# Patient Record
Sex: MALE | Race: WHITE | NOT HISPANIC OR LATINO | Employment: FULL TIME | URBAN - METROPOLITAN AREA
[De-identification: names, ages, dates, MRNs, and addresses within clinical notes are randomized per-mention and may not be internally consistent; named-entity substitution may affect disease eponyms.]

---

## 2019-02-14 ENCOUNTER — TELEPHONE (OUTPATIENT)
Dept: GASTROENTEROLOGY | Facility: CLINIC | Age: 49
End: 2019-02-14

## 2019-02-14 NOTE — TELEPHONE ENCOUNTER
Spoke w pt to offer asap appt on 2/22. 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

## 2019-02-19 ENCOUNTER — TELEPHONE (OUTPATIENT)
Dept: GASTROENTEROLOGY | Facility: CLINIC | Age: 49
End: 2019-02-19

## 2019-02-19 NOTE — TELEPHONE ENCOUNTER
Called pt and scheduled NP appointment for 3/12/19 at 9:30 am. Discussed with pt that Dr. Alfredo is a consultant who will send them back to their general GI provider once their symptoms improved and plan of care is established. Pt was told the logistics of the appointment (arrival time, length of visit, total time spent at facility, and Nathalia Warren, NP role). Pt was also told that Dr. Alfredo will review their previous workup but may order additional test and perform her own procedures and that this may require an overnight stay at a local hotel to complete the workup.

## 2019-03-12 ENCOUNTER — LAB VISIT (OUTPATIENT)
Dept: LAB | Facility: HOSPITAL | Age: 49
End: 2019-03-12
Payer: COMMERCIAL

## 2019-03-12 ENCOUNTER — OFFICE VISIT (OUTPATIENT)
Dept: GASTROENTEROLOGY | Facility: CLINIC | Age: 49
End: 2019-03-12
Payer: COMMERCIAL

## 2019-03-12 ENCOUNTER — TELEPHONE (OUTPATIENT)
Dept: GASTROENTEROLOGY | Facility: CLINIC | Age: 49
End: 2019-03-12

## 2019-03-12 VITALS
WEIGHT: 276.88 LBS | HEART RATE: 72 BPM | HEIGHT: 74 IN | SYSTOLIC BLOOD PRESSURE: 118 MMHG | DIASTOLIC BLOOD PRESSURE: 83 MMHG | BODY MASS INDEX: 35.53 KG/M2

## 2019-03-12 DIAGNOSIS — K21.9 GASTROESOPHAGEAL REFLUX DISEASE WITHOUT ESOPHAGITIS: Primary | ICD-10-CM

## 2019-03-12 DIAGNOSIS — R07.9 CHEST PAIN, UNSPECIFIED TYPE: ICD-10-CM

## 2019-03-12 DIAGNOSIS — R13.19 ESOPHAGEAL DYSPHAGIA: ICD-10-CM

## 2019-03-12 DIAGNOSIS — K21.9 GASTROESOPHAGEAL REFLUX DISEASE WITHOUT ESOPHAGITIS: ICD-10-CM

## 2019-03-12 LAB
ALBUMIN SERPL BCP-MCNC: 4.2 G/DL
ALP SERPL-CCNC: 71 U/L
ALT SERPL W/O P-5'-P-CCNC: 135 U/L
ANION GAP SERPL CALC-SCNC: 7 MMOL/L
AST SERPL-CCNC: 63 U/L
BASOPHILS # BLD AUTO: 0.02 K/UL
BASOPHILS NFR BLD: 0.4 %
BILIRUB SERPL-MCNC: 0.8 MG/DL
BUN SERPL-MCNC: 16 MG/DL
CALCIUM SERPL-MCNC: 10 MG/DL
CHLORIDE SERPL-SCNC: 106 MMOL/L
CO2 SERPL-SCNC: 26 MMOL/L
CREAT SERPL-MCNC: 1.1 MG/DL
DIFFERENTIAL METHOD: NORMAL
EOSINOPHIL # BLD AUTO: 0.1 K/UL
EOSINOPHIL NFR BLD: 2.2 %
ERYTHROCYTE [DISTWIDTH] IN BLOOD BY AUTOMATED COUNT: 12.1 %
EST. GFR  (AFRICAN AMERICAN): >60 ML/MIN/1.73 M^2
EST. GFR  (NON AFRICAN AMERICAN): >60 ML/MIN/1.73 M^2
GLUCOSE SERPL-MCNC: 113 MG/DL
HCT VFR BLD AUTO: 43.8 %
HGB BLD-MCNC: 15 G/DL
IMM GRANULOCYTES # BLD AUTO: 0.01 K/UL
IMM GRANULOCYTES NFR BLD AUTO: 0.2 %
LYMPHOCYTES # BLD AUTO: 2 K/UL
LYMPHOCYTES NFR BLD: 36.2 %
MCH RBC QN AUTO: 30.4 PG
MCHC RBC AUTO-ENTMCNC: 34.2 G/DL
MCV RBC AUTO: 89 FL
MONOCYTES # BLD AUTO: 0.6 K/UL
MONOCYTES NFR BLD: 11 %
NEUTROPHILS # BLD AUTO: 2.7 K/UL
NEUTROPHILS NFR BLD: 50 %
NRBC BLD-RTO: 0 /100 WBC
PLATELET # BLD AUTO: 230 K/UL
PMV BLD AUTO: 9.5 FL
POTASSIUM SERPL-SCNC: 4.6 MMOL/L
PROT SERPL-MCNC: 7.4 G/DL
RBC # BLD AUTO: 4.93 M/UL
SODIUM SERPL-SCNC: 139 MMOL/L
TSH SERPL DL<=0.005 MIU/L-ACNC: 1.98 UIU/ML
WBC # BLD AUTO: 5.47 K/UL

## 2019-03-12 PROCEDURE — 84443 ASSAY THYROID STIM HORMONE: CPT

## 2019-03-12 PROCEDURE — 36415 COLL VENOUS BLD VENIPUNCTURE: CPT

## 2019-03-12 PROCEDURE — 85025 COMPLETE CBC W/AUTO DIFF WBC: CPT

## 2019-03-12 PROCEDURE — 80053 COMPREHEN METABOLIC PANEL: CPT

## 2019-03-12 PROCEDURE — 99999 PR PBB SHADOW E&M-EST. PATIENT-LVL V: CPT | Mod: PBBFAC,,, | Performed by: NURSE PRACTITIONER

## 2019-03-12 PROCEDURE — 99999 PR PBB SHADOW E&M-EST. PATIENT-LVL V: ICD-10-PCS | Mod: PBBFAC,,, | Performed by: NURSE PRACTITIONER

## 2019-03-12 PROCEDURE — 99245 PR OFFICE CONSULTATION,LEVEL V: ICD-10-PCS | Mod: S$GLB,,, | Performed by: NURSE PRACTITIONER

## 2019-03-12 PROCEDURE — 99245 OFF/OP CONSLTJ NEW/EST HI 55: CPT | Mod: S$GLB,,, | Performed by: NURSE PRACTITIONER

## 2019-03-12 RX ORDER — AMLODIPINE BESYLATE 5 MG/1
5 TABLET ORAL DAILY
COMMUNITY

## 2019-03-12 RX ORDER — OLMESARTAN MEDOXOMIL, AMLODIPINE AND HYDROCHLOROTHIAZIDE TABLET 40/5/25 MG 40; 5; 25 MG/1; MG/1; MG/1
TABLET ORAL
COMMUNITY

## 2019-03-12 RX ORDER — MONTELUKAST SODIUM 10 MG/1
10 TABLET ORAL NIGHTLY
COMMUNITY

## 2019-03-12 RX ORDER — PANTOPRAZOLE SODIUM 40 MG/1
40 TABLET, DELAYED RELEASE ORAL DAILY
COMMUNITY
End: 2019-03-12 | Stop reason: DRUGHIGH

## 2019-03-12 RX ORDER — PANTOPRAZOLE SODIUM 40 MG/1
40 TABLET, DELAYED RELEASE ORAL 2 TIMES DAILY
Qty: 180 TABLET | Refills: 3 | Status: SHIPPED | OUTPATIENT
Start: 2019-03-12 | End: 2019-07-16 | Stop reason: SDUPTHER

## 2019-03-12 RX ORDER — ISOSORBIDE DINITRATE 10 MG/1
10 TABLET ORAL 2 TIMES DAILY
Qty: 60 TABLET | Refills: 11 | Status: SHIPPED | OUTPATIENT
Start: 2019-03-12 | End: 2020-02-06 | Stop reason: SDUPTHER

## 2019-03-12 RX ORDER — NITROGLYCERIN 0.4 MG/1
0.4 TABLET SUBLINGUAL 4 TIMES DAILY PRN
Qty: 90 TABLET | Refills: 3 | Status: SHIPPED | OUTPATIENT
Start: 2019-03-12 | End: 2020-02-06

## 2019-03-12 NOTE — LETTER
March 14, 2019        Samer Gregg Em MD  [address]  [facility] [address]             Ricrado jazzy - Gastroenterology  [address]  [facility] [address]  Phone: [phone]  Fax: [phone]   Patient: Kale Rivas   MR Number: [id number]   YOB: 1970   Date of Visit: 3/12/2019       Dear Dr. Magana:    Thank you for referring Kale Rivas to me for evaluation. Attached you will find relevant portions of my assessment and plan of care.    If you have questions, please do not hesitate to call me. I look forward to following Kale Rivas along with you.    Sincerely,                  CC  No Recipients    Enclosure

## 2019-03-12 NOTE — TELEPHONE ENCOUNTER
MOTILITY CLINIC PROCEDURE ORDERS    CLEARANCE FOR PROCEDURES:   Cardiac    PROCEDURES  EGD with EndoFlip   Esophageal manometry with impedance - dysphagia     FLOOR:  4th Floor     PREP  Standard Prep    MEDICATIONS     Motility Studies (esophageal manometry/anorectal manometry)  Hold Narcotics x 3 days   Hold TCA x 3 days  No fentanyl of benzodiazepine during sedation     ORDER OF TESTING:  Day 1: EGD then manometry  Day 2: esophagram

## 2019-03-12 NOTE — PROGRESS NOTES
[facility] Consultation Note    Reason for Consult:    Chief Complaint   Patient presents with    Dysphagia         PCP:   Primary Doctor No   [address] / [facility] [address]    Referring MD:  Nehemias Magana Md  [address]  [place] [address]      HPI:  Kale Rivas is a 48 y.o. male with a PMH of HTN referred to motility clinic for second opinion regarding the following problems:    GERD.  Well controlled with pantoprazole 40 mg once daily.  Retrosternal pyrosis:yes  Regurgitation:yes  Belching:yes  Onset: 20+ yrs    Chest pain. Central chest and left chest.  Radiates up to teeth  Onset: a little over 1 yr  Frequency: previously once weekly, now occurring once monthly since 2-3 months ago       Triggers (cold fluids, caffeine, smoking, ETOH): none noticed. Not r/t eating/swallowing  Consumes mostly soft foods and liquids:on liquid diet x 2 months  Caffeine intake:3 cups coffee/day, 3-4 glasses tea daily  Negative cardiac workup:  none    No improvement with tums  Has not tried nifedipine   diltiazem   isosorrbide dinitrate   peppermint oil   sildenafil   trazodone   Botox    Dysphagia.  Reports difficulty swallowing.  Onset of symptoms:a littler over 1 year  Problems with solids:yes  Problems with liquids:yes  Worse with cold temp intake  Choking while eating:yes  Coughing while eating: yes  Location: distal esopahgus  Frequency:  daily  Improves with:no improvement on liquid diet (currently). Better with warmer temp foods., better with belching.  No improvement with dilations.   History of food impactions requiring ED visit:no  History of allergies:no meds  History of seasonal allergies:yes  History of food allergies:no  History of eczema:no    Achalasia Eckardt Score  Dysphagia  (none (0), occasional (1), daily (2), with every meal (3)): 3  Regurgitation (none (0), occasional (1), daily (2), with every meal (3)): 1  Chest pain (none (0), occasional  (1), daily (2), several times per day (3)): 1  Weight loss (kg) (0 (0), <5 (1), 5-10 (2), >10 (3)): 3  Total Eckardt Score(the final score is the sum of four components (0-12)):  8/12    Weight loss. Loss a total of 20 lbs since s/s began 1 yr ago. Has gained 8-10 lbs back. Is now weight stable. Drinking ensure. And bananna shakes nightly    Denies  nausea, vomiting, early satiety, abdominal pain, bloating, diarrhea, constipation, BRBPR, melena, weight loss, anxiety/depression, insomnia.       Total visit time was 90 minutes, more than 50% of which was spent in face-to-face counseling with patient regarding symptoms, diagnostic results, prognosis, risks and benefits of treatment options, instructions for management, importance of compliance with chosen treatment options, risk factor reduction, stress reduction, coping strategies.      Previous Studies:   US 7/15/2019: Fatty infiltration of the liver.  Left renal calculus.  No obstruction  EGD 1/25/19: 1-2 cm HH. marked resistance to the passage of the scope at the GEJ, dilated with 54 Fr Esquivel  Upper GI 12/11/18: stricture at distal esophagus just above GEJ w/ 80% narrowing. BT got held up throughout test.  EGD 9/28/18: 2-3 cm HH. schatzki ring dilated 54 fr Esquivel, mod distal esophageal erosions (chr inflammation).     ROS:  ROS   Constitutional: No fevers, no chills, no night sweats, no weight loss  ENT: No congestion, no rhinorrhea, no chronic sinus problems  CV: + chest pain, no palpitations  Pulm: No cough, no shortness of breath  Ophtho: No blurry vision, no eye redness  GI: see HPI  Derm: No rash  Heme: No lymphadenopathy, no bruising  MSK: No joint pain, no joint swelling, no Raynauds  : No dysuria, no frequent urination, no blood in urine  Endo: No hot or cold intolerance  Neuro: No dizziness, no syncope, no seizure  Psych: No anxiety, no depression        Medical History:   Past Medical History:   Diagnosis Date    GERD (gastroesophageal reflux  disease)     HTN (hypertension)         Surgical History:   Past Surgical History:   Procedure Laterality Date    APPENDECTOMY      UPPER GASTROINTESTINAL ENDOSCOPY          Family History:   Family History   Problem Relation Age of Onset    Colon polyps Maternal Grandmother     Diverticulitis Maternal Grandmother     Lung cancer Maternal Grandfather     Celiac disease Neg Hx     Cirrhosis Neg Hx     Colon cancer Neg Hx     Crohn's disease Neg Hx     Cystic fibrosis Neg Hx     Esophageal cancer Neg Hx     Hemochromatosis Neg Hx     Inflammatory bowel disease Neg Hx     Irritable bowel syndrome Neg Hx     Liver cancer Neg Hx     Liver disease Neg Hx     Rectal cancer Neg Hx     Stomach cancer Neg Hx     Ulcerative colitis Neg Hx     Shalom's disease Neg Hx     Lymphoma Neg Hx     Tuberculosis Neg Hx     Scleroderma Neg Hx     Rheum arthritis Neg Hx     Multiple sclerosis Neg Hx     Melanoma Neg Hx     Lupus Neg Hx     Psoriasis Neg Hx     Skin cancer Neg Hx         Social History:   Social History     Socioeconomic History    Marital status:      Spouse name: None    Number of children: None    Years of education: None    Highest education level: None   Social Needs    Financial resource strain: None    Food insecurity - worry: None    Food insecurity - inability: None    Transportation needs - medical: None    Transportation needs - non-medical: None   Occupational History    None   Tobacco Use    Smoking status: Former Smoker     Last attempt to quit: 3/12/2009     Years since quitting: 10.0    Smokeless tobacco: Never Used   Substance and Sexual Activity    Alcohol use: No     Frequency: Never    Drug use: No    Sexual activity: None   Other Topics Concern    None   Social History Narrative    None        Review of patient's allergies indicates:  Allergies not on file    Current Outpatient Medications   Medication Sig Dispense Refill    amLODIPine (NORVASC) 5  "MG tablet Take 5 mg by mouth once daily.      montelukast (SINGULAIR) 10 mg tablet Take 10 mg by mouth every evening.      olmesartan-amLODIPin-hcthiazid 40-5-25 mg Tab Take by mouth.      isosorbide dinitrate (ISORDIL) 10 MG tablet Take 1 tablet (10 mg total) by mouth 2 (two) times daily. One to two times daily for chest pain 60 tablet 11    nitroGLYCERIN (NITROSTAT) 0.4 MG SL tablet Place 1 tablet (0.4 mg total) under the tongue 4 (four) times daily as needed (dysphagia). 90 tablet 3    pantoprazole (PROTONIX) 40 MG tablet Take 1 tablet (40 mg total) by mouth 2 (two) times daily. 180 tablet 3     No current facility-administered medications for this visit.         Objective Findings:  Vital Signs:  /83   Pulse 72   Ht 6' 2" (1.88 m)   Wt 125.6 kg (276 lb 14.4 oz)   BMI 35.55 kg/m²   Body mass index is 35.55 kg/m².    Physical Exam:  General appearance: alert, cooperative, no distress  HENT: Normocephalic, atraumatic, neck symmetrical, no nasal discharge  Eyes: conjunctivae/corneas clear, PERRL, EOM's intact  Lungs: clear to auscultation bilaterally, no dullness to percussion bilaterally  Heart: regular rate and rhythm without rub; no displacement of the PMI  Abdomen: soft, non-tender; bowel sounds normoactive; no organomegaly  Extremities: extremities symmetric; no clubbing, cyanosis, or edema  Integument: Skin color, texture, turgor normal; no rashes; hair distrubution normal  Neurologic: Alert and oriented X 3, normal strength, normal coordination and gait  Psychiatric: no pressured speech; normal affect; no evidence of impaired cognition    Labs:  Lab Results   Component Value Date    WBC 5.47 03/12/2019    HGB 15.0 03/12/2019    HCT 43.8 03/12/2019    MCV 89 03/12/2019     03/12/2019     No results found for: FERRITIN  Lab Results   Component Value Date     03/12/2019    K 4.6 03/12/2019     03/12/2019    CO2 26 03/12/2019     (H) 03/12/2019    BUN 16 03/12/2019    " CREATININE 1.1 03/12/2019    CALCIUM 10.0 03/12/2019    PROT 7.4 03/12/2019    ALBUMIN 4.2 03/12/2019    BILITOT 0.8 03/12/2019    ALKPHOS 71 03/12/2019    AST 63 (H) 03/12/2019     (H) 03/12/2019     Lab Results   Component Value Date    TSH 1.980 03/12/2019     No results found for: SEDRATE  No results found for: CRP  No results found for: LABA1C, HGBA1C        Assessment and Plan:  Kale Rivas is a 48 y.o. male with a PMH of HTN referred to motility clinic for second opinion regarding the following problems:    GERD.  Hiatal hernia. Well controlled with pantoprazole 40 mg once daily.    Chest pain. No previous cardiac work up.  No improvement with tums  -Follow up with PCP for chest pain. Will need clearance prior to EGD.  -EGD with endoflip and e/g bx  -Esophagram with 13 mm barium tablet.  -Esophageal manometry  -Check labs  -Decrease caffeine intake  -Start isosorbide 1-2 times daily  -Start peppermint oil, tea or mints before meals  -Start nitro as needed.  -Has not tried nifedipine, diltiazem, isosorbide, sildenafil, trazodone, botox    Dysphagia.  Esophageal stricture on EGD and esophagram. Barium tablet hung up at stricture. Distal esophageal erosions on EGD. Achalasia Eckardt score of 8/12.  No improvement with dilation.   -EGD  -Esophagram with 13 mm BT  -Esophageal manometry  -Start isosorbide 1-2 times daily  -Start peppermint oil, tea or mints before meals  -Increase PPI to BID.  -Discussed pathophysiology, presentation and treatment of achalasia, including botox, pneumatic dilation, myotomy, and POEM.      Weight loss. Loss a total of 20 lbs since symptioms began 1 yr ago. Has gained 8-10 lbs back. Is now weight stable. Drinking ensure in the AM and bananna shakes HS.    *Needs cardiac clearance from PCP prior to EGD.  Pt leaving for 1 month for work.  PCP aware of chest pain and did not think it was cardiac  Pt will call PCP today, may have to see PCP in 1 month to get formal clearance.   Will schedule procedures for when pt returns even without clearance.  PT understands that we will have to cancel if we do not get clearance.       Follow-up in about 3 months (around 6/12/2019) for Motility with Dr. Alfredo.    1. Gastroesophageal reflux disease without esophagitis    2. Chest pain, unspecified type    3. Esophageal dysphagia          Order summary:  Orders Placed This Encounter    [place] Esophagram Complete    CBC auto differential    Comprehensive metabolic panel    TSH    pantoprazole (PROTONIX) 40 MG tablet    nitroGLYCERIN (NITROSTAT) 0.4 MG SL tablet    isosorbide dinitrate (ISORDIL) 10 MG tablet    Case request GI: EGD (ESOPHAGOGASTRODUODENOSCOPY)    Case request GI: MANOMETRY, ESOPHAGUS, WITH IMPEDANCE MEASUREMENT         Thank you so much for allowing me to participate in the care of Kale Rivas          JOHANNE Cespedes, FNP-C    I have personally reviewed history, performed physical exam, and educated the patient.  I have reviewed and agree with today's findings and the care plan outlined by Nathalia Benoit NP.  Plan:  clearance from PCP (PCP aware of chest pain and did not think it was cardiac), labs, EGD w EndoFlip, manom, esophagogram, nitro SL, dinitrate QD to TID, reduce caffeine intake.       Niya Alfredo MD

## 2019-03-12 NOTE — PATIENT INSTRUCTIONS
Increase the protonix 40 mg to twice daily for difficulty swallowing.  Decrease your caffeine intake. This can help with chest pain and difficulty swallowing.  Take isosorbide dinitrate 10 mg one-two times daily for chest pain and difficulty swallowing.   Drink peppermint oil 3 drops diluted in 1/2 cup of room temperature water three times daily before meals. Alternatively, you may drink peppermint tea or use mints (altoids) for difficultly swallowing.   We have ordered labs, EGD, esophagram, esophageal manometry for further evaluation.   Have your primary care provider evaluate the chest pain to ensure it is not coming from your heart. We will need them to clear you before you can have the EGD procedure. Call us after you have seen your primary care provider.

## 2019-03-18 ENCOUNTER — TELEPHONE (OUTPATIENT)
Dept: GASTROENTEROLOGY | Facility: CLINIC | Age: 49
End: 2019-03-18

## 2019-03-18 DIAGNOSIS — R74.8 ELEVATED LIVER ENZYMES: Primary | ICD-10-CM

## 2019-03-18 NOTE — TELEPHONE ENCOUNTER
----- Message from Nathalia Benoit NP sent at 3/18/2019  1:48 PM CDT -----  Also, let him know the other labs look good: electrolytes normal, thyroid normal, no anemia, kidney function normal.     Thanks,  MAC Ruano

## 2019-03-18 NOTE — TELEPHONE ENCOUNTER
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

## 2019-03-18 NOTE — TELEPHONE ENCOUNTER
----- Message from Nathalia Benoit NP sent at 3/18/2019  1:47 PM CDT -----  Please inform PT: his labs show elevated liver enzymes. I have referred him to the liver specialist (hepatology), ordered labs, and an abd us for further eval. Please coordinate this for when he return from his job off [facility].    Thanks,  MAC Sloan

## 2019-03-20 ENCOUNTER — DOCUMENTATION ONLY (OUTPATIENT)
Dept: TRANSPLANT | Facility: CLINIC | Age: 49
End: 2019-03-20

## 2019-03-20 NOTE — NURSING
Pt records reviewed.   Pt will be referred to Hepatology.    Initial referral received  from the workque.   Referring Provider/diagnosis          BERTA ALEXIS NP   Elevated liver enzymes      Referral letter sent to patient.

## 2019-03-20 NOTE — LETTER
March 20, 2019    Kale Rivas  [address] [address]      Dear Kale Rivas:    Your doctor has referred you to the [facility]. We are sending this letter to remind you to make an appointment with us to complete the referral process.     Please call us at [phone] to schedule an appointment. We look forward to seeing you soon.     If you received a call and have been scheduled, please disregard this letter.       Sincerely,        [facility]   [address] [place] [id number]  [phone]

## 2019-03-26 ENCOUNTER — TELEPHONE (OUTPATIENT)
Dept: HEPATOLOGY | Facility: CLINIC | Age: 49
End: 2019-03-26

## 2019-03-26 NOTE — TELEPHONE ENCOUNTER
Called patients wife back and she stated that she was waiting on the ok from there PCP in [place] before she schedules. Also she wants to see if she can find a hepatologist in [place] so she doesn't have to come so far. She is going to get with Kale and give us a call back with a decision.

## 2019-03-26 NOTE — TELEPHONE ENCOUNTER
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 She would like to speak with someone concerning letter received         Communication Preference: [phone]    Additional Information: n/a

## 2019-04-08 ENCOUNTER — PATIENT MESSAGE (OUTPATIENT)
Dept: GASTROENTEROLOGY | Facility: CLINIC | Age: 49
End: 2019-04-08

## 2019-04-09 ENCOUNTER — PATIENT MESSAGE (OUTPATIENT)
Dept: GASTROENTEROLOGY | Facility: CLINIC | Age: 49
End: 2019-04-09

## 2019-06-21 ENCOUNTER — PATIENT MESSAGE (OUTPATIENT)
Dept: GASTROENTEROLOGY | Facility: CLINIC | Age: 49
End: 2019-06-21

## 2019-07-16 DIAGNOSIS — K21.9 GASTROESOPHAGEAL REFLUX DISEASE WITHOUT ESOPHAGITIS: ICD-10-CM

## 2019-07-16 DIAGNOSIS — R07.9 CHEST PAIN, UNSPECIFIED TYPE: ICD-10-CM

## 2019-07-16 DIAGNOSIS — R13.19 ESOPHAGEAL DYSPHAGIA: ICD-10-CM

## 2019-07-16 RX ORDER — PANTOPRAZOLE SODIUM 40 MG/1
40 TABLET, DELAYED RELEASE ORAL 2 TIMES DAILY
Qty: 180 TABLET | Refills: 3 | Status: SHIPPED | OUTPATIENT
Start: 2019-07-16 | End: 2019-12-23

## 2019-07-16 NOTE — TELEPHONE ENCOUNTER
----- Message from Paris Benedict sent at 7/15/2019  4:21 PM CDT -----  Contact: pt#[phone]  Needs Advice    Reason for call; Pt states that he needs a PA for pantoprazole (PROTONIX) 40 MG tablet        Communication Preference:call    Additional Information:

## 2019-07-26 ENCOUNTER — PATIENT MESSAGE (OUTPATIENT)
Dept: GASTROENTEROLOGY | Facility: CLINIC | Age: 49
End: 2019-07-26

## 2019-07-29 ENCOUNTER — PATIENT MESSAGE (OUTPATIENT)
Dept: GASTROENTEROLOGY | Facility: CLINIC | Age: 49
End: 2019-07-29

## 2019-07-29 NOTE — TELEPHONE ENCOUNTER
Informed pt that we have not received clearance as of yet and that his 8/2 appt will need to be cancelled anyway howard to the fact that workup is not completed as of yet.

## 2019-08-06 ENCOUNTER — PATIENT MESSAGE (OUTPATIENT)
Dept: GASTROENTEROLOGY | Facility: CLINIC | Age: 49
End: 2019-08-06

## 2019-08-21 ENCOUNTER — PATIENT MESSAGE (OUTPATIENT)
Dept: GASTROENTEROLOGY | Facility: CLINIC | Age: 49
End: 2019-08-21

## 2019-08-21 ENCOUNTER — TELEPHONE (OUTPATIENT)
Dept: GASTROENTEROLOGY | Facility: CLINIC | Age: 49
End: 2019-08-21

## 2019-08-21 ENCOUNTER — TELEPHONE (OUTPATIENT)
Dept: ENDOSCOPY | Facility: HOSPITAL | Age: 49
End: 2019-08-21

## 2019-08-21 NOTE — TELEPHONE ENCOUNTER
----- Message from Irena Flores sent at 8/21/2019  3:08 PM CDT -----  Contact: self [phone]  .Needs Advice    Reason for call:         Communication Preference:phone     Additional Information:please call patient and let him know when paper work have been received please call back to discuss

## 2019-08-21 NOTE — TELEPHONE ENCOUNTER
Cleo,    Patient had pending Cardiac test ordered for further Cardiac  workup. Please contact patient to see if he has had all of these procedures completed and have results been requested? The results are needed prior to scheduling procedures.    Thank You,  Yuli

## 2019-08-22 ENCOUNTER — TELEPHONE (OUTPATIENT)
Dept: GASTROENTEROLOGY | Facility: CLINIC | Age: 49
End: 2019-08-22

## 2019-08-22 NOTE — TELEPHONE ENCOUNTER
----- Message from Karen Chambers sent at 8/22/2019  8:07 AM CDT -----  Contact: Lazara haskins/[facility]   Needs Advice    Reason for call: Lazara moss want to know why type of procedure and anesthesia the pt will have so they can clear him of cardiac        Communication Preference: [phone]    Additional Information:

## 2019-11-08 ENCOUNTER — TELEPHONE (OUTPATIENT)
Dept: GASTROENTEROLOGY | Facility: CLINIC | Age: 49
End: 2019-11-08

## 2019-11-08 DIAGNOSIS — R13.10 DYSPHAGIA, UNSPECIFIED TYPE: ICD-10-CM

## 2019-11-08 DIAGNOSIS — K21.9 GASTROESOPHAGEAL REFLUX DISEASE, ESOPHAGITIS PRESENCE NOT SPECIFIED: Primary | ICD-10-CM

## 2019-11-08 DIAGNOSIS — R07.9 CHEST PAIN, UNSPECIFIED TYPE: ICD-10-CM

## 2019-11-08 NOTE — TELEPHONE ENCOUNTER
"Spoke w pt. Pt had an apt approaching 11/19/19 w Dr. Alfredo. I explained to pt that he could not see  as the work up ordered on 3/12/19 was never completed. I explained to labs, EGD, esophagram, and esophageal manometry were ordered for further evaluation. Last time I spoke w pt was on 8/21/19 through the portal where I confirmed we received cardiac clearance and he was given endoscopy contact to set up. Pt stated " Well why the fuck didn't anyone call me." I stated that according to our last convo through the portal, I believed he understood that if he had not been contacted by the schedulers that he could call the schedulers contact given to him once in the visit and then again in portal. I also asked that pt not use profanity when talking with me. Pt got angrier and proceeded to call our office "clowns" and hung up. Apt cancelled.  "

## 2019-11-11 ENCOUNTER — TELEPHONE (OUTPATIENT)
Dept: ENDOSCOPY | Facility: HOSPITAL | Age: 49
End: 2019-11-11

## 2019-11-11 ENCOUNTER — TELEPHONE (OUTPATIENT)
Dept: GASTROENTEROLOGY | Facility: CLINIC | Age: 49
End: 2019-11-11

## 2019-11-11 NOTE — TELEPHONE ENCOUNTER
Spoke with Mr. Rivas regarding scheduling his esophagram and abdominal US. Looks like he had an abdominal US done around August 2019 and I'm in the process now of obtaining those results. 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 He would like to schedule the esophagram the same day as his followup and right now we ill wait for February to open up because his only availability in January will be the last week and that will be difficult to schedule both because of Dr. Alfredo's access. Will review with Cleo and get back to Mr. Rivas. Denise Anna RN

## 2019-11-11 NOTE — TELEPHONE ENCOUNTER
Jakob larios,    I called Mr. Rivas and we had a conversation regarding his procedures ordered. He was very pleasant and  I actually had a cancellation for this Thursday at 11am and was able to schedule his EGD with EndoFlip for Thursday 11/14/19 at 11am.    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    So his Manometry is scheduled for 12/23 at 1pm. Patient was completely okay with these dates and vary thankful able to schedule this and it worked with his work schedule.    Now he is driving in from [place] and asked if he had anymore stuff to schedule. I informed him it looks like he did. Please reach out to him and try to coordinate the US and Esophagram if you can around 12/23 and reach out to him before Friday.    I appreciate it.  Thanks!  Maria Luisa

## 2019-11-14 ENCOUNTER — TELEPHONE (OUTPATIENT)
Dept: GASTROENTEROLOGY | Facility: CLINIC | Age: 49
End: 2019-11-14

## 2019-11-14 ENCOUNTER — ANESTHESIA (OUTPATIENT)
Dept: ENDOSCOPY | Facility: HOSPITAL | Age: 49
End: 2019-11-14
Payer: COMMERCIAL

## 2019-11-14 ENCOUNTER — HOSPITAL ENCOUNTER (OUTPATIENT)
Facility: HOSPITAL | Age: 49
Discharge: HOME | End: 2019-11-14
Attending: INTERNAL MEDICINE | Admitting: INTERNAL MEDICINE
Payer: COMMERCIAL

## 2019-11-14 ENCOUNTER — ANESTHESIA EVENT (OUTPATIENT)
Dept: ENDOSCOPY | Facility: HOSPITAL | Age: 49
End: 2019-11-14
Payer: COMMERCIAL

## 2019-11-14 VITALS
OXYGEN SATURATION: 98 % | WEIGHT: 280 LBS | RESPIRATION RATE: 19 BRPM | HEART RATE: 79 BPM | DIASTOLIC BLOOD PRESSURE: 66 MMHG | BODY MASS INDEX: 37.11 KG/M2 | HEIGHT: 73 IN | SYSTOLIC BLOOD PRESSURE: 113 MMHG | TEMPERATURE: 99 F

## 2019-11-14 DIAGNOSIS — R13.10 DYSPHAGIA, UNSPECIFIED TYPE: Primary | ICD-10-CM

## 2019-11-14 DIAGNOSIS — R13.10 DYSPHAGIA: ICD-10-CM

## 2019-11-14 PROCEDURE — 43239 EGD BIOPSY SINGLE/MULTIPLE: CPT | Performed by: INTERNAL MEDICINE

## 2019-11-14 PROCEDURE — 37000008 HC ANESTHESIA 1ST 15 MINUTES: Performed by: INTERNAL MEDICINE

## 2019-11-14 PROCEDURE — E9220 PRA ENDO ANESTHESIA: ICD-10-PCS | Mod: ,,, | Performed by: NURSE ANESTHETIST, CERTIFIED REGISTERED

## 2019-11-14 PROCEDURE — 63600175 PHARM REV CODE 636 W HCPCS: Performed by: INTERNAL MEDICINE

## 2019-11-14 PROCEDURE — E9220 PRA ENDO ANESTHESIA: HCPCS | Mod: ,,, | Performed by: NURSE ANESTHETIST, CERTIFIED REGISTERED

## 2019-11-14 PROCEDURE — 43239 EGD BIOPSY SINGLE/MULTIPLE: CPT | Mod: 51,,, | Performed by: INTERNAL MEDICINE

## 2019-11-14 PROCEDURE — 88305 TISSUE EXAM BY PATHOLOGIST: ICD-10-PCS | Mod: 26,,, | Performed by: PATHOLOGY

## 2019-11-14 PROCEDURE — 88305 TISSUE EXAM BY PATHOLOGIST: CPT | Mod: 26,,, | Performed by: PATHOLOGY

## 2019-11-14 PROCEDURE — 37000009 HC ANESTHESIA EA ADD 15 MINS: Performed by: INTERNAL MEDICINE

## 2019-11-14 PROCEDURE — 88305 TISSUE EXAM BY PATHOLOGIST: CPT | Mod: 59 | Performed by: PATHOLOGY

## 2019-11-14 PROCEDURE — 91040 ESOPH BALLOON DISTENSION TST: CPT | Performed by: INTERNAL MEDICINE

## 2019-11-14 PROCEDURE — C1726 CATH, BAL DIL, NON-VASCULAR: HCPCS | Performed by: INTERNAL MEDICINE

## 2019-11-14 PROCEDURE — 63600175 PHARM REV CODE 636 W HCPCS: Performed by: NURSE ANESTHETIST, CERTIFIED REGISTERED

## 2019-11-14 PROCEDURE — 27201012 HC FORCEPS, HOT/COLD, DISP: Performed by: INTERNAL MEDICINE

## 2019-11-14 PROCEDURE — 43239 PR EGD, FLEX, W/BIOPSY, SGL/MULTI: ICD-10-PCS | Mod: 51,,, | Performed by: INTERNAL MEDICINE

## 2019-11-14 RX ORDER — PROPOFOL 10 MG/ML
VIAL (ML) INTRAVENOUS
Status: DISCONTINUED | OUTPATIENT
Start: 2019-11-14 | End: 2019-11-14

## 2019-11-14 RX ORDER — LIDOCAINE HCL/PF 100 MG/5ML
SYRINGE (ML) INTRAVENOUS
Status: DISCONTINUED | OUTPATIENT
Start: 2019-11-14 | End: 2019-11-14

## 2019-11-14 RX ORDER — SODIUM CHLORIDE 0.9 % (FLUSH) 0.9 %
10 SYRINGE (ML) INJECTION
Status: DISCONTINUED | OUTPATIENT
Start: 2019-11-14 | End: 2019-11-14 | Stop reason: HOSPADM

## 2019-11-14 RX ORDER — PROPOFOL 10 MG/ML
VIAL (ML) INTRAVENOUS CONTINUOUS PRN
Status: DISCONTINUED | OUTPATIENT
Start: 2019-11-14 | End: 2019-11-14

## 2019-11-14 RX ORDER — SODIUM CHLORIDE 9 MG/ML
INJECTION, SOLUTION INTRAVENOUS CONTINUOUS
Status: DISCONTINUED | OUTPATIENT
Start: 2019-11-14 | End: 2019-11-14 | Stop reason: HOSPADM

## 2019-11-14 RX ADMIN — PROPOFOL 50 MG: 10 INJECTION, EMULSION INTRAVENOUS at 11:11

## 2019-11-14 RX ADMIN — PROPOFOL 250 MCG/KG/MIN: 10 INJECTION, EMULSION INTRAVENOUS at 11:11

## 2019-11-14 RX ADMIN — LIDOCAINE HYDROCHLORIDE 100 MG: 20 INJECTION, SOLUTION INTRAVENOUS at 11:11

## 2019-11-14 RX ADMIN — PROPOFOL 150 MG: 10 INJECTION, EMULSION INTRAVENOUS at 11:11

## 2019-11-14 RX ADMIN — SODIUM CHLORIDE: 0.9 INJECTION, SOLUTION INTRAVENOUS at 10:11

## 2019-11-14 NOTE — TRANSFER OF CARE
"Anesthesia Transfer of Care Note    Patient: Kale Rivas    Procedure(s) Performed: Procedure(s) (LRB):  EGD (ESOPHAGOGASTRODUODENOSCOPY) (N/A)    Patient location: GI    Anesthesia Type: general    Transport from OR: Transported from OR on room air with adequate spontaneous ventilation    Post pain: adequate analgesia    Post assessment: no apparent anesthetic complications and tolerated procedure well    Post vital signs: stable    Level of consciousness: responds to stimulation    Nausea/Vomiting: no nausea/vomiting    Complications: none    Transfer of care protocol was followed      Last vitals:   Visit Vitals  /62 (BP Location: Left arm, Patient Position: Lying)   Pulse 83   Temp 37.1 °C (98.8 °F) (Temporal)   Resp 16   Ht 6' 1" (1.854 m)   Wt 127 kg (280 lb)   SpO2 96%   BMI 36.94 kg/m²     "

## 2019-11-14 NOTE — DISCHARGE INSTRUCTIONS

## 2019-11-14 NOTE — ANESTHESIA PREPROCEDURE EVALUATION
11/14/2019  Kale Rivas is a 48 y.o., male.    Past Medical History:   Diagnosis Date    GERD (gastroesophageal reflux disease)     HTN (hypertension)      Past Surgical History:   Procedure Laterality Date    APPENDECTOMY      UPPER GASTROINTESTINAL ENDOSCOPY           Anesthesia Evaluation    I have reviewed the Patient Summary Reports.     I have reviewed the Medications.     Review of Systems  Anesthesia Hx:  No problems with previous Anesthesia  History of prior surgery of interest to airway management or planning:   Social:  Former Smoker    Cardiovascular:   Hypertension Angina NORMAN ECG has been reviewed. Cardiac clearance received. See media tab.    Echo: EF 60-65%   Pulmonary:   Shortness of breath    Hepatic/GI:   GERD        Physical Exam  General:  Obesity, Large Beard    Airway/Jaw/Neck:  Airway Findings: Mouth Opening: Normal Tongue: Normal  Mallampati: III  Improves to II with phonation.  TM Distance: Normal, at least 6 cm  Jaw/Neck Findings:  Limited Ability to Prognath  Neck ROM: Normal ROM     Eyes/Ears/Nose:  EYES/EARS/NOSE FINDINGS: Normal   Dental:  Dental Findings: In tact   Chest/Lungs:  Chest/Lungs Findings: Clear to auscultation, Normal Respiratory Rate     Heart/Vascular:  Heart Findings: Rate: Normal  Rhythm: Regular Rhythm  Sounds: Normal  Heart murmur: negative       Mental Status:  Mental Status Findings:  Cooperative         Anesthesia Plan  Type of Anesthesia, risks & benefits discussed:  Anesthesia Type:  general  Patient's Preference:   Intra-op Monitoring Plan: standard ASA monitors  Intra-op Monitoring Plan Comments:   Post Op Pain Control Plan:   Post Op Pain Control Plan Comments:   Induction:   IV  Beta Blocker:  Patient is not currently on a Beta-Blocker (No further documentation required).       Informed Consent: Patient understands risks and agrees with  Anesthesia plan.  Questions answered. Anesthesia consent signed with patient.  ASA Score: 3     Day of Surgery Review of History & Physical: I have interviewed and examined the patient. I have reviewed the patient's H&P dated:  There are no significant changes.          Ready For Surgery From Anesthesia Perspective.

## 2019-11-14 NOTE — [employer] PATIENT INSTRUCTIONS
Discharge Summary/Instructions after an Endoscopic Procedure  Patient Name: Kale Rivas  Patient MRN: [id number]  Patient YOB: 1970  Thursday, November 14, 2019  Niya Alfredo MD  RESTRICTIONS:  During your procedure today, you received medications for sedation.  These   medications may affect your judgment, balance and coordination.  Therefore,   for 24 hours, you have the following restrictions:   - DO NOT drive a car, operate machinery, make legal/financial decisions,   sign important papers or drink alcohol.    ACTIVITY:  Today: no heavy lifting, straining or running due to procedural   sedation/anesthesia.  The following day: return to full activity including work.  DIET:  Eat and drink normally unless instructed otherwise.     TREATMENT FOR COMMON SIDE EFFECTS:  - Mild abdominal pain, nausea, belching, bloating or excessive gas:  rest,   eat lightly and use a heating pad.  - Sore Throat: treat with throat lozenges and/or gargle with warm salt   water.  - Because air was used during the procedure, expelling large amounts of air   from your rectum or belching is normal.  - If a bowel prep was taken, you may not have a bowel movement for 1-3 days.    This is normal.  SYMPTOMS TO WATCH FOR AND REPORT TO YOUR PHYSICIAN:  1. Abdominal pain or bloating, other than gas cramps.  2. Chest pain.  3. Back pain.  4. Signs of infection such as: chills or fever occurring within 24 hours   after the procedure.  5. Rectal bleeding, which would show as bright red, maroon, or black stools.   (A tablespoon of blood from the rectum is not serious, especially if   hemorrhoids are present.)  6. Vomiting.  7. Weakness or dizziness.  GO DIRECTLY TO THE NEAREST EMERGENCY ROOM IF YOU HAVE ANY OF THE FOLLOWING:      Difficulty breathing              Chills and/or fever over 101 F   Persistent vomiting and/or vomiting blood   Severe abdominal pain   Severe chest pain   Black, tarry stools   Bleeding- more than one  tablespoon   Any other symptom or condition that you feel may need urgent attention  Your doctor recommends these additional instructions:  If any biopsies were taken, your doctors clinic will contact you in 1 to 2   weeks with any results.  - Await pathology results.   - Discharge patient to home (with escort).   - Resume previous diet.   - Continue present medications.   - Await pathology results.   - The findings and recommendations were discussed with the patient.   - Patient has a contact number available for emergencies.  The signs and   symptoms of potential delayed complications were discussed with the   patient.  Return to normal activities tomorrow.  Written discharge   instructions were provided to the patient.  For questions, problems or results please call your physician - Niya Alfredo MD at Work:  [phone].  [facility], EMERGENCY ROOM PHONE NUMBER: [phone]  IF A COMPLICATION OR EMERGENCY SITUATION ARISES AND YOU ARE UNABLE TO REACH   YOUR PHYSICIAN - GO DIRECTLY TO THE EMERGENCY ROOM.  Niya Alfredo MD  11/14/2019 11:56:22 AM  This report has been verified and signed electronically.  [employer]

## 2019-11-14 NOTE — TELEPHONE ENCOUNTER
US 7/15/2019: Fatty infiltration of the liver.  Left renal calculus.  No obstruction  pls let pt know that us shows fatty liver.  He should address this w his ref Gi doctor or we can ref him to Hepatology at [facility]

## 2019-11-15 ENCOUNTER — TELEPHONE (OUTPATIENT)
Dept: GASTROENTEROLOGY | Facility: CLINIC | Age: 49
End: 2019-11-15

## 2019-11-19 LAB
FINAL PATHOLOGIC DIAGNOSIS: NORMAL
GROSS: NORMAL

## 2019-11-21 ENCOUNTER — TELEPHONE (OUTPATIENT)
Dept: ENDOSCOPY | Facility: HOSPITAL | Age: 49
End: 2019-11-21

## 2019-12-16 ENCOUNTER — TELEPHONE (OUTPATIENT)
Dept: GASTROENTEROLOGY | Facility: CLINIC | Age: 49
End: 2019-12-16

## 2019-12-23 ENCOUNTER — HOSPITAL ENCOUNTER (OUTPATIENT)
Facility: HOSPITAL | Age: 49
Discharge: HOME | End: 2019-12-23
Attending: INTERNAL MEDICINE | Admitting: INTERNAL MEDICINE
Payer: COMMERCIAL

## 2019-12-23 VITALS
DIASTOLIC BLOOD PRESSURE: 86 MMHG | BODY MASS INDEX: 37.6 KG/M2 | RESPIRATION RATE: 18 BRPM | WEIGHT: 293 LBS | HEART RATE: 88 BPM | HEIGHT: 74 IN | TEMPERATURE: 99 F | OXYGEN SATURATION: 95 % | SYSTOLIC BLOOD PRESSURE: 128 MMHG

## 2019-12-23 DIAGNOSIS — R13.10 DYSPHAGIA: ICD-10-CM

## 2019-12-23 PROCEDURE — 91010 ESOPHAGUS MOTILITY STUDY: CPT | Mod: 26,,, | Performed by: INTERNAL MEDICINE

## 2019-12-23 PROCEDURE — 25000003 PHARM REV CODE 250: Performed by: INTERNAL MEDICINE

## 2019-12-23 PROCEDURE — 91037 ESOPH IMPED FUNCTION TEST: CPT | Performed by: INTERNAL MEDICINE

## 2019-12-23 PROCEDURE — 91010 ESOPHAGUS MOTILITY STUDY: CPT | Performed by: INTERNAL MEDICINE

## 2019-12-23 PROCEDURE — 91010 PR ESOPHAGEAL MOTILITY STUDY, MA2METRY: ICD-10-PCS | Mod: 26,,, | Performed by: INTERNAL MEDICINE

## 2019-12-23 PROCEDURE — 91037 PR GERD TST W/ NASAL IMPEDENCE ELECTROD: ICD-10-PCS | Mod: 26,,, | Performed by: INTERNAL MEDICINE

## 2019-12-23 PROCEDURE — 91037 ESOPH IMPED FUNCTION TEST: CPT | Mod: 26,,, | Performed by: INTERNAL MEDICINE

## 2019-12-23 RX ORDER — LIDOCAINE HYDROCHLORIDE 20 MG/ML
JELLY TOPICAL ONCE
Status: COMPLETED | OUTPATIENT
Start: 2019-12-23 | End: 2019-12-23

## 2019-12-23 RX ADMIN — LIDOCAINE HYDROCHLORIDE 10 ML: 20 JELLY TOPICAL at 02:12

## 2019-12-23 NOTE — DISCHARGE INSTRUCTIONS
Esophageal Manometry     A catheter measures pressure along the esophagus.     Esophageal manometry is a test to measure the strength and function of the esophagus (the food pipe). Results can help identify causes of heartburn, swallowing problems, or chest pain. The test can also help plan surgery and determine the success of previous surgery.  Preparing for the test  Be sure to talk to your healthcare provider about any medicines you take. Some medicines can affect the test results. Also ask any questions you have about the risks of the test. These include irritation to the nose and throat. Be sure not to smoke, eat, or drink for up to 12 hours before the test.  During the test  Manometry takes about an hour. Usually you lie down during the test. Your nose and throat are numbed. Then a soft, thin tube is placed through the nose and down the esophagus. At first you may notice a gagging feeling. You will be asked to swallow several times. Holes along the tube measure the pressure while you swallow. Measurements are printed out as tracings, much like a heart test tracing. After the test, another catheter may be left in the esophagus for up to 24 hours to measure acid (pH) levels.  After esophageal manometry  Youll probably discuss the results of the test with your healthcare provider at another appointment. This is because time is needed to review the tracings. You may have a mild sore throat for a short time. As soon as the numbness in your throat is gone, you can return to eating and your normal activities.  Date Last Reviewed: 6/1/2016  © 3757-7878 [employer]. [address] [place] [address], [place] [address]. All rights reserved. This information is not intended as a substitute for professional medical care. Always follow your healthcare professional's instructions.

## 2019-12-30 ENCOUNTER — TELEPHONE (OUTPATIENT)
Dept: GASTROENTEROLOGY | Facility: CLINIC | Age: 49
End: 2019-12-30

## 2019-12-30 NOTE — TELEPHONE ENCOUNTER
Spoke with Mr. Rivas and scheduled his Esophagram @ 9:30 AM on 2/6/20 and will followup with Ms. Benoit same day.Appts have been mailed. Denise Anna RN

## 2020-01-06 ENCOUNTER — TELEPHONE (OUTPATIENT)
Dept: GASTROENTEROLOGY | Facility: CLINIC | Age: 50
End: 2020-01-06

## 2020-01-07 NOTE — TELEPHONE ENCOUNTER
Manometry Results:    Normal LES presssure with incomplete relaxation with premature contractions and panesophageal pressurization   Achalasia -Type III/Type II vs EGJ outflow obstruction (achalasia variant vs mechanical obstruction) with ANTONIETTA  Incomplete bolus clearance  Abnormal multiple rapid swallows test  No significant difference with upright swallows and apple sauce swallows  Unable to properly perform amee cracker swallows  Evidence of residual liquid in esophagus after 200 cc bolus    Please let patient know that the manometry shows    Blockage at connection of esophagus possible achalasia    Recommendation:  Follow up with Dr. Alfredo after all studies completed

## 2020-02-06 ENCOUNTER — OFFICE VISIT (OUTPATIENT)
Dept: GASTROENTEROLOGY | Facility: CLINIC | Age: 50
End: 2020-02-06
Payer: COMMERCIAL

## 2020-02-06 ENCOUNTER — HOSPITAL ENCOUNTER (OUTPATIENT)
Dept: RADIOLOGY | Facility: HOSPITAL | Age: 50
Discharge: HOME | End: 2020-02-06
Attending: NURSE PRACTITIONER
Payer: COMMERCIAL

## 2020-02-06 VITALS
DIASTOLIC BLOOD PRESSURE: 87 MMHG | WEIGHT: 276 LBS | HEART RATE: 75 BPM | HEIGHT: 74 IN | SYSTOLIC BLOOD PRESSURE: 128 MMHG | BODY MASS INDEX: 35.42 KG/M2

## 2020-02-06 DIAGNOSIS — R07.9 CHEST PAIN, UNSPECIFIED TYPE: ICD-10-CM

## 2020-02-06 DIAGNOSIS — R13.19 ESOPHAGEAL DYSPHAGIA: ICD-10-CM

## 2020-02-06 PROCEDURE — 99999 PR PBB SHADOW E&M-EST. PATIENT-LVL IV: ICD-10-PCS | Mod: PBBFAC,,, | Performed by: NURSE PRACTITIONER

## 2020-02-06 PROCEDURE — 99214 OFFICE O/P EST MOD 30 MIN: CPT | Mod: S$GLB,,, | Performed by: NURSE PRACTITIONER

## 2020-02-06 PROCEDURE — 74220 FL ESOPHAGRAM COMPLETE: ICD-10-PCS | Mod: 26,,, | Performed by: RADIOLOGY

## 2020-02-06 PROCEDURE — 99999 PR PBB SHADOW E&M-EST. PATIENT-LVL IV: CPT | Mod: PBBFAC,,, | Performed by: NURSE PRACTITIONER

## 2020-02-06 PROCEDURE — 74220 X-RAY XM ESOPHAGUS 1CNTRST: CPT | Mod: 26,,, | Performed by: RADIOLOGY

## 2020-02-06 PROCEDURE — 3008F BODY MASS INDEX DOCD: CPT | Mod: CPTII,S$GLB,, | Performed by: NURSE PRACTITIONER

## 2020-02-06 PROCEDURE — 25500020 PHARM REV CODE 255: Performed by: NURSE PRACTITIONER

## 2020-02-06 PROCEDURE — 3008F PR BODY MASS INDEX (BMI) DOCUMENTED: ICD-10-PCS | Mod: CPTII,S$GLB,, | Performed by: NURSE PRACTITIONER

## 2020-02-06 PROCEDURE — 74220 X-RAY XM ESOPHAGUS 1CNTRST: CPT | Mod: TC

## 2020-02-06 PROCEDURE — 99214 PR OFFICE/OUTPT VISIT, EST, LEVL IV, 30-39 MIN: ICD-10-PCS | Mod: S$GLB,,, | Performed by: NURSE PRACTITIONER

## 2020-02-06 PROCEDURE — A9698 NON-RAD CONTRAST MATERIALNOC: HCPCS | Performed by: NURSE PRACTITIONER

## 2020-02-06 RX ORDER — ISOSORBIDE DINITRATE 10 MG/1
10 TABLET ORAL 2 TIMES DAILY
Qty: 180 TABLET | Refills: 3 | Status: SHIPPED | OUTPATIENT
Start: 2020-02-06 | End: 2020-05-06

## 2020-02-06 RX ADMIN — BARIUM SULFATE 310 ML: 0.6 SUSPENSION ORAL at 09:02

## 2020-02-06 NOTE — PROGRESS NOTES
[facility] Consultation Note    Reason for Consult:    Chief Complaint   Patient presents with    Chest Pain    Dysphagia     trouble swallowing    Nausea     vomiting         PCP:   Primary Doctor No       Referring MD:  No referring provider defined for this encounter.      HPI:  Kale Rivas is a 49 y.o. male with a PMH of HTN referred to motility clinic for second opinion regarding the following problems:    GERD.  Well controlled with pantoprazole 40 mg once daily., taking twice daily for dysphagia and chest pain.   Retrosternal pyrosis:yes  Regurgitation:yes  Belching:yes  Onset: 20+ yrs    Chest pain. Central chest and left chest.  Radiates up to teeth. Some improvement  Onset: a little over 1 yr  Frequency: previously once weekly, now occurring once monthly or less       Triggers (cold fluids, caffeine, smoking, ETOH): none noticed. Not r/t eating/swallowing  Consumes mostly soft foods and liquids:on liquid diet x 2 months  Caffeine intake:3 cups coffee/day, 3-4 glasses tea daily  Negative cardiac workup:  none    No improvement with tums  Has not tried nifedipine   diltiazem    peppermint oil   sildenafil   trazodone   Botox  Some improvement with nitro PRN  No improvement with few drops of peppermint oil in 16 oz water.  Has not started isosorbide as previously advised      Dysphagia.  Reports difficulty swallowing. Worsening.   Onset of symptoms:a littler over 1 year  Problems with solids:yes  Problems with liquids:yes  Worse with cold temp intake  Choking while eating:yes  Coughing while eating: yes  Location: distal esopahgus  Frequency:  daily  Improves with:no improvement on liquid diet (currently). Better with warmer temp foods., better with belching.  No improvement with dilations.   History of food impactions requiring ED visit:no  History of allergies:no meds  History of seasonal allergies:yes  History of food allergies:no  History of eczema:no    Achalasia  Eckardt Score  Dysphagia  (none (0), occasional (1), daily (2), with every meal (3)): 3  Regurgitation (none (0), occasional (1), daily (2), with every meal (3)): 2,worse  Chest pain (none (0), occasional (1), daily (2), several times per day (3)): 1  Weight loss (kg) (0 (0), <5 (1), 5-10 (2), >10 (3)): 3  Total Eckardt Score(the final score is the sum of four components (0-12)):  9/12    Weight loss. Loss a total of 20 lbs since s/s began 1 yr ago. Has gained 8-10 lbs back. Is now weight stable. Drinking ensure. And bananna shakes nightly    Denies  nausea, vomiting, early satiety, abdominal pain, bloating, diarrhea, constipation, BRBPR, melena, weight loss, anxiety/depression, insomnia.       Total visit time was 40 minutes, more than 50% of which was spent in face-to-face counseling with patient regarding symptoms, diagnostic results, prognosis, risks and benefits of treatment options, instructions for management, importance of compliance with chosen treatment options, risk factor reduction, stress reduction, coping strategies.      Previous Studies:   Esophagram 2/6/20:  13 mm barium tablet remained in the distal esophagus throughout the entirety of the study. 7 cm at 0 minutes.  12 cm at 1 minute.  14 cm at 3 minutes.  12 cm at 5 minutes.  This indicates abnormal emptying/clearance. There is narrowing of the distal esophagus and intermittent opening consistent with achalasia. Esophageal peristalsis is present though significantly diminished.  Overall caliber of the esophagus is mildly dilated.  Some contrast noted flowing through the gastroesophageal junction and into the stomach.  Esophageal manometry 12/23/19:Normal LES presssure with incomplete relaxation  with premature contractions and panesophageal  pressurization. Achalasia -Type III/Type II vs EGJ outflow  obstruction (achalasia variant vs mechanical obstruction) with ANTONIETTA. Incomplete bolus clearance.  Abnormal multiple rapid swallows test. No  significant difference with upright swallows and apple sauce swallows. Unable to properly perform amee cracker swallows. Evidence of residual liquid in esophagus after 200  cc bolus  EGD 11/14/19:  Normal limit esophagus (negative).  Esophageal polyps (benign papillomas).  Gastric erythema (chemical reactive gastropathy).  Normal duodenum.  endo flip 11/14/2019:  Normal esophageal distensibility at GEJ.    US 7/15/2019: Fatty infiltration of the liver.  Left renal calculus.  No obstruction  EGD 1/25/19: 1-2 cm HH. marked resistance to the passage of the scope at the GEJ, dilated with 54 Fr Esquivel  Upper GI 12/11/18: stricture at distal esophagus just above GEJ w/ 80% narrowing. BT got held up throughout test.  EGD 9/28/18: 2-3 cm HH. schatzki ring dilated 54 fr Esquivel, mod distal esophageal erosions (chr inflammation).     ROS:  ROS   Constitutional: No fevers, no chills, no night sweats, no weight loss  ENT: No congestion, no rhinorrhea, no chronic sinus problems  CV: + chest pain, no palpitations  Pulm: No cough, no shortness of breath  Ophtho: No blurry vision, no eye redness  GI: see HPI  Derm: No rash  Heme: No lymphadenopathy, no bruising  MSK: No joint pain, no joint swelling, no Raynauds  : No dysuria, no frequent urination, no blood in urine  Endo: No hot or cold intolerance  Neuro: No dizziness, no syncope, no seizure  Psych: No anxiety, no depression        Medical History:   Past Medical History:   Diagnosis Date    GERD (gastroesophageal reflux disease)     HTN (hypertension)         Surgical History:   Past Surgical History:   Procedure Laterality Date    APPENDECTOMY      ESOPHAGEAL MANOMETRY WITH MEASUREMENT OF IMPEDANCE N/A 12/23/2019    Procedure: MANOMETRY, ESOPHAGUS, WITH IMPEDANCE MEASUREMENT;  Surgeon: Niya Alfredo MD;  Location: [facility] ([address] [place] [address]);  Service: Endoscopy;  Laterality: N/A;  destination patient driving from far away needed this time -sm  hold Narcotics and TCA meds  3 days  12/16 pt confirmed appt    ESOPHAGOGASTRODUODENOSCOPY N/A 11/14/2019    Procedure: EGD (ESOPHAGOGASTRODUODENOSCOPY);  Surgeon: Niya Alfredo MD;  Location: [address];  Service: Endoscopy;  Laterality: N/A;  EndoFlip  pt traveling in from [place]  11/11 - pt confirmed appt    UPPER GASTROINTESTINAL ENDOSCOPY          Family History:   Family History   Problem Relation Age of Onset    Colon polyps Maternal Grandmother     Diverticulitis Maternal Grandmother     Lung cancer Maternal Grandfather     Celiac disease Neg Hx     Cirrhosis Neg Hx     Colon cancer Neg Hx     Crohn's disease Neg Hx     Cystic fibrosis Neg Hx     Esophageal cancer Neg Hx     Hemochromatosis Neg Hx     Inflammatory bowel disease Neg Hx     Irritable bowel syndrome Neg Hx     Liver cancer Neg Hx     Liver disease Neg Hx     Rectal cancer Neg Hx     Stomach cancer Neg Hx     Ulcerative colitis Neg Hx     Shalom's disease Neg Hx     Lymphoma Neg Hx     Tuberculosis Neg Hx     Scleroderma Neg Hx     Rheum arthritis Neg Hx     Multiple sclerosis Neg Hx     Melanoma Neg Hx     Lupus Neg Hx     Psoriasis Neg Hx     Skin cancer Neg Hx         Social History:   Social History     Socioeconomic History    Marital status:      Spouse name: Not on file    Number of children: Not on file    Years of education: Not on file    Highest education level: Not on file   Occupational History    Not on file   Social Needs    Financial resource strain: Not on file    Food insecurity:     Worry: Not on file     Inability: Not on file    Transportation needs:     Medical: Not on file     Non-medical: Not on file   Tobacco Use    Smoking status: Former Smoker     Last attempt to quit: 3/12/2009     Years since quitting: 10.9    Smokeless tobacco: Never Used   Substance and Sexual Activity    Alcohol use: No     Frequency: Never    Drug use: No    Sexual activity: Not on file   Lifestyle    Physical  "activity:     Days per week: Not on file     Minutes per session: Not on file    Stress: Not on file   Relationships    Social connections:     Talks on phone: Not on file     Gets together: Not on file     Attends Buddhist service: Not on file     Active member of club or organization: Not on file     Attends meetings of clubs or organizations: Not on file     Relationship status: Not on file   Other Topics Concern    Not on file   Social History Narrative    Not on file        Review of patient's allergies indicates:  Allergies not on file    Current Outpatient Medications   Medication Sig Dispense Refill    amLODIPine (NORVASC) 5 MG tablet Take 5 mg by mouth once daily.      isosorbide dinitrate (ISORDIL) 10 MG tablet Take 1 tablet (10 mg total) by mouth 2 (two) times daily. One to two times daily for chest pain 180 tablet 3    montelukast (SINGULAIR) 10 mg tablet Take 10 mg by mouth every evening.      nitroGLYCERIN (NITROSTAT) 0.4 MG SL tablet Place 1 tablet (0.4 mg total) under the tongue 4 (four) times daily as needed (dysphagia). 90 tablet 3    olmesartan-amLODIPin-hcthiazid 40-5-25 mg Tab Take by mouth.      pantoprazole (PROTONIX) 40 MG tablet Take 1 tablet (40 mg total) by mouth 2 (two) times daily. 180 tablet 3     No current facility-administered medications for this visit.         Objective Findings:  Vital Signs:  /87   Pulse 75   Ht 6' 2" (1.88 m)   Wt 125.2 kg (276 lb)   BMI 35.44 kg/m²   Body mass index is 35.44 kg/m².    Physical Exam:  General appearance: alert, cooperative, no distress  HENT: Normocephalic, atraumatic, neck symmetrical, no nasal discharge  Eyes: conjunctivae/corneas clear, EOM's intact  Lungs: clear to auscultation bilaterally,   Heart: regular rate and rhythm without rub;   Abdomen: soft, non-tender; bowel sounds normoactive; no organomegaly  Extremities: extremities symmetric; no clubbing, cyanosis, or edema  Integument: Skin color, texture, turgor normal; " no rashes; hair distrubution normal  Neurologic: Alert and oriented X 3, normal strength, normal coordination and gait  Psychiatric: no pressured speech; normal affect; no evidence of impaired cognition    Labs:  Lab Results   Component Value Date    WBC 5.47 03/12/2019    HGB 15.0 03/12/2019    HCT 43.8 03/12/2019    MCV 89 03/12/2019     03/12/2019     No results found for: FERRITIN  Lab Results   Component Value Date     03/12/2019    K 4.6 03/12/2019     03/12/2019    CO2 26 03/12/2019     (H) 03/12/2019    BUN 16 03/12/2019    CREATININE 1.1 03/12/2019    CALCIUM 10.0 03/12/2019    PROT 7.4 03/12/2019    ALBUMIN 4.2 03/12/2019    BILITOT 0.8 03/12/2019    ALKPHOS 71 03/12/2019    AST 63 (H) 03/12/2019     (H) 03/12/2019     Lab Results   Component Value Date    TSH 1.980 03/12/2019     No results found for: SEDRATE  No results found for: CRP  No results found for: LABA1C, HGBA1C        Assessment and Plan:  Kale Rivas is a 49 y.o. male with a PMH of HTN referred to motility clinic for second opinion regarding the following problems:    GERD.  Hiatal hernia. Well controlled with pantoprazole 40 mg once daily.    Chest pain. EGD with esophageal papillomas and normal GEJ distensibility on endoFlip. Esophagram with:13 mm barium tablet remained in the distal esophagus throughout the entirety of the study;TBSresults: 7 cm at 0 minutes.  12 cm at 1 minute.  14 cm at 3 minutes.  12 cm at 5 minutes.  This indicates abnormal emptying/clearance, narrowing of the distal esophagus and intermittent opening consistent with achalasia. Esophageal peristalsis is present though significantly diminished.  Overall caliber of the esophagus is mildly dilated.  Esophagram manometry with Normal LES presssure with incomplete relaxation  with premature contractions and panesophageal  pressurization., Achalasia -Type III/Type II vs EGJ outflow  obstruction (achalasia variant vs mechanical obstruction)  with ANTONIETTA. Incomplete bolus clearance.  Abnormal multiple rapid swallows test. No significant difference with upright swallows and apple sauce swallows. Unable to properly perform amee cracker swallows. Evidence of residual liquid in esophagus after 200  cc bolus  No improvement with tums  No improvement with peppermint oil 3 drops in 16 oz water.  No improvement with PPI BID  -Follow up with PCP for chest pain. Will need clearance prior to EGD.  -Decrease caffeine intake  -Start isosorbide 1-2 times daily as previously advised  -Increase peppermint oil in 5 drops in 1/2 cup of water TID before meals  -Cont nitro as needed.  -Has not tried nifedipine, diltiazem, isosorbide, sildenafil, trazodone, botox    Dysphagia.  Esophageal stricture on EGD and esophagram. Barium tablet hung up at stricture. Distal esophageal erosions on EGD. Recent  EGD with esophageal papillomas and normal GEJ distensibility on endoFlip. Esophagram with:13 mm barium tablet remained in the distal esophagus throughout the entirety of the study;TBSresults: 7 cm at 0 minutes.  12 cm at 1 minute.  14 cm at 3 minutes.  12 cm at 5 minutes.  This indicates abnormal emptying/clearance, narrowing of the distal esophagus and intermittent opening consistent with achalasia. Esophageal peristalsis is present though significantly diminished.  Overall caliber of the esophagus is mildly dilated.  Esophagram manometry with Normal LES presssure with incomplete relaxation  with premature contractions and panesophageal  pressurization., Achalasia -Type III/Type II vs EGJ outflow  obstruction (achalasia variant vs mechanical obstruction) with ANTONIETTA. Incomplete bolus clearance.  Abnormal multiple rapid swallows test. No significant difference with upright swallows and apple sauce swallows. Unable to properly perform amee cracker swallows. Evidence of residual liquid in esophagus after 200  cc bolus  Achalasia Eckardt score of 8/12.  No improvement with dilation.    -Start isosorbide 1-2 times daily as previously advised  -Increase peppermint oil as above  -Cont  PPI BID.  -Previously discussed pathophysiology, presentation and treatment of achalasia, including botox, pneumatic dilation, myotomy, and POEM.      Weight loss. Loss a total of 20 lbs since symptioms began 1 yr ago. Has gained 8-10 lbs back. Is now weight stable. Drinking ensure in the AM and bananna shakes HS.    Follow up in about 4 months (around 6/6/2020) for Motility w Dr. Alfredo .    1. Chest pain, unspecified type    2. Esophageal dysphagia          Order summary:  Orders Placed This Encounter    isosorbide dinitrate (ISORDIL) 10 MG tablet         Thank you so much for allowing me to participate in the care of Kale Rivas          JOHANNE Cespedes, FNP-C

## 2020-02-06 NOTE — PATIENT INSTRUCTIONS
Increase peppermint oil (ingestible) to 5 drops in 1/2 cup of water three times daily before meals for difficulty swallowing and chest pain.    Take isosorbide 10 mg 1-2 times daily for chest pain and difficulty swallowing.     Continue taking pantoprazole 40 mg twice daily.

## 2020-02-10 ENCOUNTER — TELEPHONE (OUTPATIENT)
Dept: GASTROENTEROLOGY | Facility: CLINIC | Age: 50
End: 2020-02-10

## 2020-02-10 NOTE — TELEPHONE ENCOUNTER
Spoke with patient about results ,patient states the med Isosorbide gave him bad headaches so he cannot take

## 2020-02-11 ENCOUNTER — TELEPHONE (OUTPATIENT)
Dept: GASTROENTEROLOGY | Facility: CLINIC | Age: 50
End: 2020-02-11

## 2020-02-11 DIAGNOSIS — K22.0 ACHALASIA: Primary | ICD-10-CM

## 2020-02-11 NOTE — TELEPHONE ENCOUNTER
[facility]   [facility] MULTIDISCIPLINARY CONFERENCE  PATIENT REVIEW FORM  ____________________________________________________________    CLINIC #: [id number]    DATE: 02/11/2020    DIAGNOSIS:chest pain     [] Achalsia       [] Gastropariesis           [] Functional Dyspepsia   [] Chronic Diarrhea      [x] Dysphagia   [] Pseudoachalasia       [x] GERD   [] Hiatal Hernia       [] Dysmotility   [] Dumping Syndrome  [] Cyclic Vomiting   [] IBS       [] Outlet Obstruction    [] Fecal Incontinence    [] Hirschsprung's Disease        PRESENTER:      [x] Juni    [] Millie   [] Pravin        [] Wilfredo  [] Kevin       PATIENT SUMMARY:   Kelsey 9/12  EGD negative for achalasia  endoflip w normal distensibility  Esophageal mano w achalasia type 3 vs type 2 vs EGJOO w ANTONIETTA  On TBS 13 mm tablet did not pass, PT did not clear liquid barium either    RECOMMENDATIONS:   CT chest then POEM vs heller    CONSULT NEEDED:         [x] Surgery    [] ENT    [] GI    [] Speech Pathology                IMAGING:   CT chest    [] Esophagram     [] MBS    [] CT ABD/PELVIS       [] GES   [] MRI    [] UGI w/SBFT   [] SITZ MARKER      [] EKG    [] U/S    [] DEFOGRAM    PROCEDURES:    [] EGD  [] Impedance  [] MANOMETRY  [x] SURGICAL INTERVENTION      [] COLONOSCOPY    [] ERCP    [] EUS    [] ENTEROSCOPY

## 2020-02-14 NOTE — TELEPHONE ENCOUNTER
Please let patient know that we discussed his case in conference.  Weaker recommend CT chest.  We will consider surgery or POEM procedure after CT chest.  Schedule appointment with me right after CT chest

## 2020-02-14 NOTE — TELEPHONE ENCOUNTER
Spoke with patient about results and scheduling a CT patients states he will do CT locally I will mail him the orders

## 2020-06-03 ENCOUNTER — TELEPHONE (OUTPATIENT)
Dept: GASTROENTEROLOGY | Facility: CLINIC | Age: 50
End: 2020-06-03

## 2020-06-03 NOTE — TELEPHONE ENCOUNTER
Attempted to contact pt reg visit as he was expected to follow up in June. Pt states now just isnt a good time for him especially with things just starting to kick back up after COVID-19. He states he will call office when ready.

## 2024-08-09 NOTE — TELEPHONE ENCOUNTER
-- DO NOT REPLY / DO NOT REPLY ALL --  -- This inbox is not monitored. If this was sent to the wrong provider or department, reroute message to  [employer] Reroute pool. --  -- Message is from Engagement Center Operations (ECO) --      Message Type:  Refill Medication   Refill request for Pended medication named: Oxycodone 5 mg   Preferred pharmacy verified, and selected.   [id number] [place] [place] - [address]     Is the patient OUT of Medication?  Yes and Medication Refills handled by Practice Site        Message:                    [facility] answer, [facility]

## 2025-03-09 NOTE — TELEPHONE ENCOUNTER
Spoke with Mr. Rivas regarding findings from his US done on 7/15/2019 of fatty liver. He is being followed at home in [place] with a provider who is addressing that problem.Denise Anna RN   Previously Declined (within the last year)